# Patient Record
Sex: FEMALE | Race: WHITE
[De-identification: names, ages, dates, MRNs, and addresses within clinical notes are randomized per-mention and may not be internally consistent; named-entity substitution may affect disease eponyms.]

---

## 2021-06-29 ENCOUNTER — HOSPITAL ENCOUNTER (EMERGENCY)
Dept: HOSPITAL 4 - SED | Age: 36
Discharge: HOME | End: 2021-06-29
Payer: MEDICAID

## 2021-06-29 VITALS — SYSTOLIC BLOOD PRESSURE: 141 MMHG

## 2021-06-29 VITALS — WEIGHT: 145 LBS | BODY MASS INDEX: 25.69 KG/M2 | HEIGHT: 63 IN

## 2021-06-29 VITALS — SYSTOLIC BLOOD PRESSURE: 139 MMHG

## 2021-06-29 DIAGNOSIS — H72.93: Primary | ICD-10-CM

## 2021-06-29 PROCEDURE — 81025 URINE PREGNANCY TEST: CPT

## 2021-06-29 PROCEDURE — 99284 EMERGENCY DEPT VISIT MOD MDM: CPT

## 2021-06-29 PROCEDURE — 96372 THER/PROPH/DIAG INJ SC/IM: CPT
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
## 2021-06-29 NOTE — NUR
Patient given written and verbal discharge instructions and verbalizes 
understanding.  ER MD discussed with patient the results and treatment 
provided. Patient in stable condition. ID arm band removed. 

Rx of [place] given. Patient educated on pain management and to follow up with 
PMD. Pain Scale 3/10.

Opportunity for questions provided and answered. Medication side effect fact 
sheet provided.

## 2023-06-07 ENCOUNTER — HOSPITAL ENCOUNTER (EMERGENCY)
Dept: HOSPITAL 4 - SED | Age: 38
Discharge: HOME | End: 2023-06-07
Payer: MEDICAID

## 2023-06-07 VITALS — HEIGHT: 62 IN | WEIGHT: 140 LBS | BODY MASS INDEX: 25.76 KG/M2

## 2023-06-07 VITALS — SYSTOLIC BLOOD PRESSURE: 150 MMHG

## 2023-06-07 DIAGNOSIS — L03.116: Primary | ICD-10-CM

## 2023-06-07 DIAGNOSIS — Z79.899: ICD-10-CM

## 2023-06-07 PROCEDURE — 96372 THER/PROPH/DIAG INJ SC/IM: CPT

## 2023-06-07 PROCEDURE — 99283 EMERGENCY DEPT VISIT LOW MDM: CPT

## 2023-06-07 NOTE — NUR
Patient given written and verbal discharge instructions and verbalizes 
understanding.  ER MD discussed with patient the results and treatment 
provided. Patient in stable condition. ID arm band removed. 

Rx of CEPHALEXIN given. Patient educated on pain management and to follow up 
with PMD. Pain Scale 0.

Opportunity for questions provided and answered. Medication side effect fact 
sheet provided.

## 2023-06-07 NOTE — NUR
-------------------------------------------------------------------------------

           *** Note undone in [facility] - 06/07/23 at 1838 by CORY ***            

-------------------------------------------------------------------------------

PT STATES 2 DAYS WITH LEFT LOWER EXTREMITY CELLULITIS, DENIES ANY TRAUMA OR 
INJURY.